# Patient Record
Sex: MALE | Race: OTHER
[De-identification: names, ages, dates, MRNs, and addresses within clinical notes are randomized per-mention and may not be internally consistent; named-entity substitution may affect disease eponyms.]

---

## 2019-06-10 ENCOUNTER — HOSPITAL ENCOUNTER (OUTPATIENT)
Dept: HOSPITAL 72 - GAS | Age: 24
Discharge: HOME | End: 2019-06-10
Payer: COMMERCIAL

## 2019-06-10 VITALS — SYSTOLIC BLOOD PRESSURE: 109 MMHG | DIASTOLIC BLOOD PRESSURE: 59 MMHG

## 2019-06-10 VITALS — SYSTOLIC BLOOD PRESSURE: 107 MMHG | DIASTOLIC BLOOD PRESSURE: 63 MMHG

## 2019-06-10 VITALS — DIASTOLIC BLOOD PRESSURE: 61 MMHG | SYSTOLIC BLOOD PRESSURE: 110 MMHG

## 2019-06-10 VITALS — DIASTOLIC BLOOD PRESSURE: 49 MMHG | SYSTOLIC BLOOD PRESSURE: 112 MMHG

## 2019-06-10 VITALS — DIASTOLIC BLOOD PRESSURE: 60 MMHG | SYSTOLIC BLOOD PRESSURE: 112 MMHG

## 2019-06-10 VITALS — DIASTOLIC BLOOD PRESSURE: 72 MMHG | SYSTOLIC BLOOD PRESSURE: 116 MMHG

## 2019-06-10 VITALS — HEIGHT: 75 IN | BODY MASS INDEX: 20.89 KG/M2 | WEIGHT: 168 LBS

## 2019-06-10 VITALS — SYSTOLIC BLOOD PRESSURE: 125 MMHG | DIASTOLIC BLOOD PRESSURE: 47 MMHG

## 2019-06-10 VITALS — SYSTOLIC BLOOD PRESSURE: 119 MMHG | DIASTOLIC BLOOD PRESSURE: 74 MMHG

## 2019-06-10 DIAGNOSIS — F32.9: ICD-10-CM

## 2019-06-10 DIAGNOSIS — Z85.72: ICD-10-CM

## 2019-06-10 DIAGNOSIS — K21.9: ICD-10-CM

## 2019-06-10 DIAGNOSIS — F41.9: ICD-10-CM

## 2019-06-10 DIAGNOSIS — R10.9: Primary | ICD-10-CM

## 2019-06-10 DIAGNOSIS — K64.8: ICD-10-CM

## 2019-06-10 PROCEDURE — 94150 VITAL CAPACITY TEST: CPT

## 2019-06-10 PROCEDURE — 45380 COLONOSCOPY AND BIOPSY: CPT

## 2019-06-10 PROCEDURE — 94003 VENT MGMT INPAT SUBQ DAY: CPT

## 2019-06-10 NOTE — PRE-PROCEDURE NOTE/ATTESTATION
Pre-Procedure Note/Attestation


Complete Prior to Procedure


Planned Procedure:  not applicable


Procedure Narrative:


colonoscopy





Indications for Procedure


Pre-Operative Diagnosis:


abd pain





Attestation


I attest that I discussed the nature of the procedure; its benefits; risks and 

complications; and alternatives (and the risks and benefits of such alternatives

), prior to the procedure, with the patient (or the patient's legal 

representative).





I attest that, if there was a reasonable possibility of needing a blood 

transfusion, the patient (or the patient's legal representative) was given the 

[facility] standardized written summary, pursuant 

to the [employer]h [employer] Blood Safety Act ([place] Health and Safety Code # 1645, as 

amended).





I attest that I re-evaluated the patient just prior to the surgery and that 

there has been no change in the patient's H&P, except as documented below:











Glenroy Funes MD William 10, 2019 09:46

## 2019-06-10 NOTE — IMMEDIATE POST-OP EVALUATION
Immediate Post-Op Evalulation


Immediate Post-Op Evalulation


Procedure:  Colonoscopy


Date of Evaluation:  William 10, 2019


Time of Evaluation:  10:16


IV Fluids:  500


Blood Products:  none


Estimated Blood Loss:  min


Urinary Output:  none


Blood Pressure Systolic:  104


Blood Pressure Diastolic:  56


Pulse Rate:  68


Respiratory Rate:  20


O2 Sat by Pulse Oximetry:  98


Temperature (Fahrenheit):  97.6


Pain Score (1-10):  1


Nausea:  No


Vomiting:  No


Complications


none


Patient Status:  awake, patent, none


Hydration Status:  adequate











Bernardo Morales MD William 10, 2019 10:18

## 2019-06-10 NOTE — ANETHESIA PREOPERATIVE EVAL
Anesthesia Pre-op PMH/ROS


General


Date of Evaluation:  William 10, 2019


Time of Evaluation:  09:23


Anesthesiologist:  Carmen


ASA Score:  ASA 2


Mallampati Score


Class I : Soft palate, uvula, fauces, pillars visible


Class II: Soft palate, uvula, fauces visible


Class III: Soft palate, base of uvula visible


Class IV: Only hard plate visible


Mallampati Classification:  Class II


Surgeon:  Shantel


Diagnosis:  Abdominal pain


Surgical Procedure:  Colonosccopy


Anesthesia History:  none


Family History:  no anesthesia problems


Allergies:  


Coded Allergies:  


     No Known Allergies (Unverified , 6/10/19)


Medications:  see eMAR


Patient NPO?:  Yes





Past Medical History


Cardiovascular:  Denies: HTN, CAD, MI, valve dz, arrhythmia, other


Pulmonary:  Denies: asthma, COPD, FELICITY, other


Gastrointestinal/Genitourinary:  Reports: GERD - mild; 


   Denies: CRI, ESRD, other


Neurologic/Psychiatric:  Reports: depression/anxiety; 


   Denies: dementia, CVA, TIA, other


Endocrine:  Denies: DM, hypothyroidism, steroids, other


HEENT:  Denies: cataract (L), cataract (R), glaucoma, [place] (L), [place] (R), other


Hematology/Immune:  Reports: anemia - mild, other - Lymphoma in remission s/p 

chemo; 


   Denies: DVT, bleeding disorder


Musculoskeletal/Integumentary:  Denies: OA, RA, DJD, DDD, edema, other


PMH Narrative:


as above


PSxH Narrative:


see H&P





Anesthesia Pre-op Phys. Exam


Physician Exam





Last Vital Signs








  Date Time  Temp Pulse Resp B/P (MAP) Pulse Ox O2 Delivery O2 Flow Rate FiO2


 


6/10/19 08:53 97.5 60 18 119/74 100 Room Air  








Constitutional:  NAD


Neurologic:  CN 2-12 intact


Cardiovascular:  RRR, no M/R/G


Respiratory:  CTA


Gastrointestinal:  S/NT/ND





Airway Exam


Mallampati Score:  Class II


MO:  full


Neck:  flexible


ROM:  full


Teeth:  intact


Dentures:  no upper, no lower





Anesthesia Pre-op A/P


Labs


see chart





Risk Assessment & Plan


Assessment:


ASA 2


Plan:


Bernardo Maciel MD William 10, 2019 09:28

## 2019-06-10 NOTE — 48 HOUR POST ANESTHESIA EVAL
Post Anesthesia Evaluation


Procedure:  Colonoscopy


Date of Evaluation:  William 10, 2019


Time of Evaluation:  11:41


Blood Pressure Systolic:  116


0:  72


Pulse Rate:  68


Respiratory Rate:  20


Temperature (Fahrenheit):  97.6


O2 Sat by Pulse Oximetry:  99


Airway:  patent


Nausea:  No


Vomiting:  No


Pain Intensity:  1


Hydration Status:  adequate


Cardiopulmonary Status:


stable


Mental Status/LOC:  patient returned to baseline


Follow-up Care/Observations:


n/a


Post-Anesthesia Complications:


none


Follow-up care needed:  ready to discharge











Bernardo Morales MD William 10, 2019 11:45

## 2019-06-10 NOTE — ENDOSCOPY PROCEDURE NOTE
Endoscopy Procedure Note


General


Indication for Procedure:  r/o colitis


Procedures Performed:  colonoscopy


Operative Findings/Diagnosis:  hemorrhoids


Specimen:  yes


Pt Tolerated Procedure Well:  Yes


Estimated Blood Loss:  none





Anesthesia


Anesthesiologist:  simin


Anesthesia:  MAC





Inserted Devices


Implant(s) used?:  No





Quality


Quality of Bowel Preparation:  Good


Did scope reach the cecum?:  Yes


Was there any complications?:  No





GI Core Measures


50 yrs or older w/o bx or poly:  No


10yrs. F/U recommended:  Yes


If not recommended, why?:  Above average risk


18 years or older w/prev. colo:  No











Glenroy Funes MD William 10, 2019 10:07

## 2019-06-10 NOTE — SHORT STAY SURGERY H&P
History of Present Illness


History of Present Illness


Chief Complaint


positive stool  for calprotectin


HPI


Robbi Burgos is a 24 year old male who was admitted on  for Abdominal Pain, 

Constipated,Diarrhea, Burns In





Patient History


Allergies:  


Coded Allergies:  


     No Known Allergies (Unverified , 6/10/19)


PAST MEDICAL HISTORY:  


(1) Lymphoma





Medication History


Scheduled


No Known Medications* (NKM - No Known Medications*), 0 ., (Reported)





Review of Systems


Cardiovascular:  Reports: no symptoms


Respiratory:  Reports: no symptoms


Skeletal:  Reports: no symptoms


Gastrointestinal:  Reports: no symptoms


Genitourinary:  Reports: no symptoms


Neurologic:  Reports: no symptoms


Endocrine:  Reports: no symptoms


Hematologic:  Reports: no symptoms





Physical Exam


Vital Signs





Last Vital Signs








  Date Time  Temp Pulse Resp B/P (MAP) Pulse Ox O2 Delivery O2 Flow Rate FiO2


 


6/10/19 08:53 97.5 60 18 119/74 100 Room Air  








Skin:  normal


HENT:  normal


Heart:  normal


Lungs:  normal


Extremities:  normal





Plan


Plan of Care


colonoscopy


Attestation


Are the patient's medical conditions optimized for surgery?


Attestation Response:  yes











Glenroy Funes MD William 10, 2019 09:47

## 2019-06-10 NOTE — PROCEDURE NOTE
DATE OF PROCEDURE:  06/10/2019

SURGEON:  Glenroy Funes M.D.



PROCEDURE:  Colonoscopy with biopsy.



ANESTHESIOLOGIST:  Bernardo Morales M.D.



INSTRUMENT:  Olympus adult flexible colonoscope.



INDICATION:  Positive calprotectin and abdominal pain.



REASON FOR PROCEDURE:  The procedure, risks, benefits, and possible

consequences, including hemorrhage, aspiration, perforation and infection,

and alternative treatments, were explained to the patient/legal guardian

by Dr. Glenroy Funes and the patient/legal guardian understood and

accepted these risks.



PROCEDURE IN DETAIL:  After informed consent was obtained and the patient

was adequately sedated, first rectal exam was performed, which was normal.

Then, the scope advanced from the rectum into the cecum then subsequently

into the terminal ileum.  Quality of prep was very good.



The patient had some bumpy mucosa in the terminal ilium, which was

biopsied.  Otherwise, the rest of the colonic examination grossly looked

within normal limits.  Retroflexion of rectum showed evidence of internal

hemorrhoids.



SUMMARY OF FINDINGS:

1. Bumpy mucosa in the terminal ileum status post biopsy.  Otherwise,

normal colonoscopy examination.

2. Internal hemorrhoids.



RECOMMENDATIONS:  Follow up pathology and treat accordingly.



I want to thank, Dr. Glenroy Talbert, for this kind referral.









  ______________________________________________

  Glenroy Funes M.D.





DR:  GIANCARLO

D:  06/10/2019 10:07

T:  06/10/2019 17:10

JOB#:  [id number]

CC:  Glenroy Talbert M.D.; Fax#:  [phone]